# Patient Record
Sex: FEMALE | Race: WHITE
[De-identification: names, ages, dates, MRNs, and addresses within clinical notes are randomized per-mention and may not be internally consistent; named-entity substitution may affect disease eponyms.]

---

## 2019-06-04 NOTE — HP
HISTORY OF PRESENT ILLNESS:  The patient is a 53-year-old female, right-hand

dominant, who has an 18-month history of pain and tingling in both hands, left

greater than right.  She was diagnosed with carpal tunnel syndrome 20 years ago and

has used splints intermittently.  Injection a year ago gave her some temporary

relief, but recently she has noticed progressive pain and tingling in the median

nerve distribution, left greater than right, despite rest, splinting, and use of

anti-inflammatory medications, and the previous injection. 



PAST MEDICAL HISTORY:  The patient is otherwise in good health.  She has history of

anxiety, anemia, and asthma. 



CURRENT MEDICATIONS:  Include Symbicort.



ALLERGIES:  SHE IS ALLERGIC TO NAPROXEN.



FAMILY HISTORY:  Otherwise, unremarkable.



SOCIAL HISTORY:  Otherwise, unremarkable.



REVIEW OF SYSTEMS:  Otherwise, unremarkable.



PHYSICAL EXAMINATION:

GENERAL:  Reveals a healthy, heavy-set female. 

HEENT:  Unremarkable. 

NECK:  Supple. 

CHEST:  Clear. 

HEART:  Regular rate and rhythm. 

ABDOMEN:  Soft and nontender. 

PELVIC:  Deferred. 

RECTAL:  Deferred. 

BREAST:  Deferred. 

EXTREMITIES:  Pertinent findings are related to both wrists.  On the left side,

there is some questionable thenar atrophy.  There is tenderness over the median

nerve bilaterally.  There is full range of motion bilaterally.  There is a positive

Tinel's sign bilaterally, left greater than right.  Negative Phalen's test

bilaterally.  There is decreased sensation in the median nerve distribution

bilaterally, left greater than right.  Motor strength appears to be intact.  There

is good capillary refill. 



DIAGNOSTIC STUDIES:  Reveal moderate-to-severe carpal tunnel syndrome bilaterally,

worse on the left with axonal damage on the left. 



IMPRESSION:  Bilateral carpal tunnel syndrome, left greater than right.



PLAN:  Endoscopic possible open left carpal tunnel release.  The nature of the

surgery, length of recovery, and potential complications such as infection, loss of

motion, incomplete relief, nerve injury, recurrence, and need for additional

treatment and repeat surgery have been discussed in detail.  She will probably

require similar procedure on the right side when she has recovered from the left. 







Job ID:  [id number]

## 2019-06-05 ENCOUNTER — HOSPITAL ENCOUNTER (OUTPATIENT)
Dept: HOSPITAL 92 - SDC | Age: 54
Discharge: HOME | End: 2019-06-05
Attending: ORTHOPAEDIC SURGERY
Payer: COMMERCIAL

## 2019-06-05 VITALS — BODY MASS INDEX: 45.7 KG/M2

## 2019-06-05 DIAGNOSIS — Z88.8: ICD-10-CM

## 2019-06-05 DIAGNOSIS — Z79.899: ICD-10-CM

## 2019-06-05 DIAGNOSIS — G56.03: Primary | ICD-10-CM

## 2019-06-05 LAB
BASOPHILS # BLD AUTO: 0.1 THOU/UL (ref 0–0.2)
BASOPHILS NFR BLD AUTO: 1.3 % (ref 0–1)
EOSINOPHIL # BLD AUTO: 0.1 THOU/UL (ref 0–0.7)
EOSINOPHIL NFR BLD AUTO: 1.3 % (ref 0–10)
HGB BLD-MCNC: 12.9 G/DL (ref 12–16)
LYMPHOCYTES # BLD: 1.9 THOU/UL (ref 1.2–3.4)
LYMPHOCYTES NFR BLD AUTO: 26.1 % (ref 21–51)
MCH RBC QN AUTO: 27.6 PG (ref 27–31)
MCV RBC AUTO: 83 FL (ref 78–98)
MONOCYTES # BLD AUTO: 0.7 THOU/UL (ref 0.11–0.59)
MONOCYTES NFR BLD AUTO: 9.4 % (ref 0–10)
NEUTROPHILS # BLD AUTO: 4.5 THOU/UL (ref 1.4–6.5)
NEUTROPHILS NFR BLD AUTO: 61.9 % (ref 42–75)
PLATELET # BLD AUTO: 289 THOU/UL (ref 130–400)
RBC # BLD AUTO: 4.67 MILL/UL (ref 4.2–5.4)
WBC # BLD AUTO: 7.3 THOU/UL (ref 4.8–10.8)

## 2019-06-05 PROCEDURE — 85025 COMPLETE CBC W/AUTO DIFF WBC: CPT

## 2019-06-05 PROCEDURE — 93010 ELECTROCARDIOGRAM REPORT: CPT

## 2019-06-05 PROCEDURE — 01N54ZZ RELEASE MEDIAN NERVE, PERCUTANEOUS ENDOSCOPIC APPROACH: ICD-10-PCS | Performed by: ORTHOPAEDIC SURGERY

## 2019-06-05 PROCEDURE — 93005 ELECTROCARDIOGRAM TRACING: CPT

## 2019-06-05 NOTE — EKG
Test Reason : PREOP

Blood Pressure : ***/*** mmHG

Vent. Rate : 074 BPM     Atrial Rate : 074 BPM

   P-R Int : 160 ms          QRS Dur : 088 ms

    QT Int : 380 ms       P-R-T Axes : 030 010 100 degrees

   QTc Int : 421 ms

 

Sinus rhythm with occasional Premature ventricular complexes

Nonspecific T wave abnormality

Abnormal ECG

No previous ECGs available

Confirmed by EZEQUIEL WESTON, DR. PINK ([id number]) on 6/5/2019 8:27:27 PM

 

Referred By:  SOLOMON           Confirmed By:DR. JOESPH NIEVES MD

## 2019-06-05 NOTE — OP
DATE OF PROCEDURE:  06/05/2019



ANESTHESIA:  General.



PREOPERATIVE DIAGNOSIS:  Left carpal tunnel syndrome.



POSTOPERATIVE DIAGNOSIS:  Left carpal tunnel syndrome.



PROCEDURE PERFORMED:  Left endoscopic carpal tunnel release.



DESCRIPTION OF PROCEDURE:  After satisfactory anesthesia was induced in supine

position, the patient was prepped and draped in routine manner.  Field block was

accomplished with 1% plain lidocaine.  The left arm was elevated and exsanguinated

with Esmarch bandage, and the tourniquet inflated to 250 mmHg.  A 2-cm transverse

incision was made in the proximal wrist flexion crease, carried down through the

subcutaneous tissues and bleeding points were controlled with Bovie cautery.  Using

sharp and blunt dissection, a distally based flap at deep forearm fascia was

developed and retracted distally.  Palmaris longus tendon retracted radially.

Proximal edge of the deep forearm fascia was split under direct visualization with

small scissors to make sure there was no proximal impingement of the median nerve.

Synovial elevator was introduced beneath the transverse carpal ligament and the

synovium cleaned from the under surface.  Carpal tunnel dilators were inserted.  The

3M Mimi endoscopic carpal tunnel system was introduced beneath the transverse carpal

ligament in line with the ring finger.  The distal edge of the ligament was easily

identified and divided in a distal to proximal direction by pulling the trigger of

the assembly and engaging the knife and withdrawing the scope proximally.  This was

done in several stages to make sure there was complete division of the transverse

carpal ligament, which was documented with the video printer.  After withdrawing the

scope, the carpal tunnel dilator could be inserted into the carpal tunnel and there

was markedly improved passage and subcutaneous position of the instrument.  The

scope was reintroduced into the carpal tunnel.  There was wide separation of the two

leaves of the transverse carpal ligament.  The tourniquet was released after 6

minutes.  There was no excessive bleeding, and the scope was withdrawn.  The wound

was then thoroughly irrigated and closed with a running subcuticular 3-0 nylon.

Sterile dressing was applied, and the patient was placed in a Velcro wrist splint.

She was awakened and taken to recovery room in stable condition.  There were no

apparent intraoperative complications.  The estimated blood loss was negligible. 



The patient will be discharged home in satisfactory condition, started on ice

elevation, and given written wound care instructions.  She was given a prescription

for Norco 5 for pain, 24 tablets.  She will be rechecked in my office in 10 to 14

days or sooner if there are any problems prior to that time. 







Job ID:  [id number]